# Patient Record
Sex: FEMALE | Race: OTHER | HISPANIC OR LATINO | URBAN - METROPOLITAN AREA
[De-identification: names, ages, dates, MRNs, and addresses within clinical notes are randomized per-mention and may not be internally consistent; named-entity substitution may affect disease eponyms.]

---

## 2017-12-09 ENCOUNTER — EMERGENCY (EMERGENCY)
Facility: HOSPITAL | Age: 32
LOS: 1 days | Discharge: HOME | End: 2017-12-09
Attending: EMERGENCY MEDICINE
Payer: MEDICAID

## 2017-12-09 VITALS
WEIGHT: 229.94 LBS | RESPIRATION RATE: 18 BRPM | HEART RATE: 63 BPM | DIASTOLIC BLOOD PRESSURE: 72 MMHG | HEIGHT: 60 IN | OXYGEN SATURATION: 99 % | SYSTOLIC BLOOD PRESSURE: 114 MMHG | TEMPERATURE: 98 F

## 2017-12-09 DIAGNOSIS — Z98.89 OTHER SPECIFIED POSTPROCEDURAL STATES: Chronic | ICD-10-CM

## 2017-12-09 DIAGNOSIS — Z90.49 ACQUIRED ABSENCE OF OTHER SPECIFIED PARTS OF DIGESTIVE TRACT: Chronic | ICD-10-CM

## 2017-12-09 LAB
ALBUMIN SERPL ELPH-MCNC: 4.4 G/DL — SIGNIFICANT CHANGE UP (ref 3.3–5.2)
ALP SERPL-CCNC: 66 U/L — SIGNIFICANT CHANGE UP (ref 40–120)
ALT FLD-CCNC: 103 U/L — HIGH
ANION GAP SERPL CALC-SCNC: 15 MMOL/L — SIGNIFICANT CHANGE UP (ref 5–17)
AST SERPL-CCNC: 56 U/L — HIGH
BASOPHILS # BLD AUTO: 0 K/UL — SIGNIFICANT CHANGE UP (ref 0–0.2)
BASOPHILS NFR BLD AUTO: 0.4 % — SIGNIFICANT CHANGE UP (ref 0–2)
BILIRUB SERPL-MCNC: 0.5 MG/DL — SIGNIFICANT CHANGE UP (ref 0.4–2)
BUN SERPL-MCNC: 11 MG/DL — SIGNIFICANT CHANGE UP (ref 8–20)
CALCIUM SERPL-MCNC: 9.6 MG/DL — SIGNIFICANT CHANGE UP (ref 8.6–10.2)
CHLORIDE SERPL-SCNC: 101 MMOL/L — SIGNIFICANT CHANGE UP (ref 98–107)
CO2 SERPL-SCNC: 22 MMOL/L — SIGNIFICANT CHANGE UP (ref 22–29)
CREAT SERPL-MCNC: 0.49 MG/DL — LOW (ref 0.5–1.3)
EOSINOPHIL # BLD AUTO: 0.1 K/UL — SIGNIFICANT CHANGE UP (ref 0–0.5)
EOSINOPHIL NFR BLD AUTO: 1.5 % — SIGNIFICANT CHANGE UP (ref 0–6)
GLUCOSE SERPL-MCNC: 110 MG/DL — SIGNIFICANT CHANGE UP (ref 70–115)
HCG UR QL: NEGATIVE — SIGNIFICANT CHANGE UP
HCT VFR BLD CALC: 41.8 % — SIGNIFICANT CHANGE UP (ref 37–47)
HGB BLD-MCNC: 14.6 G/DL — SIGNIFICANT CHANGE UP (ref 12–16)
LYMPHOCYTES # BLD AUTO: 2.8 K/UL — SIGNIFICANT CHANGE UP (ref 1–4.8)
LYMPHOCYTES # BLD AUTO: 32.9 % — SIGNIFICANT CHANGE UP (ref 20–55)
MCHC RBC-ENTMCNC: 29.6 PG — SIGNIFICANT CHANGE UP (ref 27–31)
MCHC RBC-ENTMCNC: 34.9 G/DL — SIGNIFICANT CHANGE UP (ref 32–36)
MCV RBC AUTO: 84.6 FL — SIGNIFICANT CHANGE UP (ref 81–99)
MONOCYTES # BLD AUTO: 0.4 K/UL — SIGNIFICANT CHANGE UP (ref 0–0.8)
MONOCYTES NFR BLD AUTO: 5.3 % — SIGNIFICANT CHANGE UP (ref 3–10)
NEUTROPHILS # BLD AUTO: 5 K/UL — SIGNIFICANT CHANGE UP (ref 1.8–8)
NEUTROPHILS NFR BLD AUTO: 59.7 % — SIGNIFICANT CHANGE UP (ref 37–73)
PLATELET # BLD AUTO: 280 K/UL — SIGNIFICANT CHANGE UP (ref 150–400)
POTASSIUM SERPL-MCNC: 4 MMOL/L — SIGNIFICANT CHANGE UP (ref 3.5–5.3)
POTASSIUM SERPL-SCNC: 4 MMOL/L — SIGNIFICANT CHANGE UP (ref 3.5–5.3)
PROT SERPL-MCNC: 7.8 G/DL — SIGNIFICANT CHANGE UP (ref 6.6–8.7)
RBC # BLD: 4.94 M/UL — SIGNIFICANT CHANGE UP (ref 4.4–5.2)
RBC # FLD: 13.3 % — SIGNIFICANT CHANGE UP (ref 11–15.6)
SODIUM SERPL-SCNC: 138 MMOL/L — SIGNIFICANT CHANGE UP (ref 135–145)
WBC # BLD: 8.4 K/UL — SIGNIFICANT CHANGE UP (ref 4.8–10.8)
WBC # FLD AUTO: 8.4 K/UL — SIGNIFICANT CHANGE UP (ref 4.8–10.8)

## 2017-12-09 PROCEDURE — 99284 EMERGENCY DEPT VISIT MOD MDM: CPT

## 2017-12-09 PROCEDURE — 36415 COLL VENOUS BLD VENIPUNCTURE: CPT

## 2017-12-09 PROCEDURE — 96374 THER/PROPH/DIAG INJ IV PUSH: CPT

## 2017-12-09 PROCEDURE — 99284 EMERGENCY DEPT VISIT MOD MDM: CPT | Mod: 25

## 2017-12-09 PROCEDURE — 81025 URINE PREGNANCY TEST: CPT

## 2017-12-09 PROCEDURE — 96375 TX/PRO/DX INJ NEW DRUG ADDON: CPT

## 2017-12-09 PROCEDURE — 85027 COMPLETE CBC AUTOMATED: CPT

## 2017-12-09 PROCEDURE — 76705 ECHO EXAM OF ABDOMEN: CPT

## 2017-12-09 PROCEDURE — 83690 ASSAY OF LIPASE: CPT

## 2017-12-09 PROCEDURE — T1013: CPT

## 2017-12-09 PROCEDURE — 76705 ECHO EXAM OF ABDOMEN: CPT | Mod: 26

## 2017-12-09 PROCEDURE — 80053 COMPREHEN METABOLIC PANEL: CPT

## 2017-12-09 PROCEDURE — 84702 CHORIONIC GONADOTROPIN TEST: CPT

## 2017-12-09 RX ORDER — PANTOPRAZOLE SODIUM 20 MG/1
40 TABLET, DELAYED RELEASE ORAL ONCE
Qty: 0 | Refills: 0 | Status: COMPLETED | OUTPATIENT
Start: 2017-12-09 | End: 2017-12-09

## 2017-12-09 RX ORDER — FAMOTIDINE 10 MG/ML
1 INJECTION INTRAVENOUS
Qty: 14 | Refills: 0 | OUTPATIENT
Start: 2017-12-09 | End: 2017-12-16

## 2017-12-09 RX ORDER — FAMOTIDINE 10 MG/ML
20 INJECTION INTRAVENOUS ONCE
Qty: 0 | Refills: 0 | Status: COMPLETED | OUTPATIENT
Start: 2017-12-09 | End: 2017-12-09

## 2017-12-09 RX ORDER — OMEPRAZOLE 10 MG/1
1 CAPSULE, DELAYED RELEASE ORAL
Qty: 30 | Refills: 0 | OUTPATIENT
Start: 2017-12-09 | End: 2018-01-08

## 2017-12-09 RX ORDER — METOCLOPRAMIDE HCL 10 MG
10 TABLET ORAL ONCE
Qty: 0 | Refills: 0 | Status: COMPLETED | OUTPATIENT
Start: 2017-12-09 | End: 2017-12-09

## 2017-12-09 RX ADMIN — Medication 30 MILLILITER(S): at 19:36

## 2017-12-09 RX ADMIN — FAMOTIDINE 20 MILLIGRAM(S): 10 INJECTION INTRAVENOUS at 19:36

## 2017-12-09 RX ADMIN — Medication 10 MILLIGRAM(S): at 15:08

## 2017-12-09 RX ADMIN — PANTOPRAZOLE SODIUM 40 MILLIGRAM(S): 20 TABLET, DELAYED RELEASE ORAL at 18:21

## 2017-12-09 NOTE — ED ADULT NURSE NOTE - OBJECTIVE STATEMENT
pt presents to ED with epigastric pain and nausea x one month. pt dneies vomiting/diahrea. afebrile. denies urinary complaints. a&ox3

## 2017-12-09 NOTE — ED STATDOCS - MEDICAL DECISION MAKING DETAILS
31 y/o F presenting with signs of gastritis vs biliary colic as she is s/p cholecystectomy and exam points to epigastric TTP. Plan to treat for GERD. Will order US to r/o retained stones and eval liver as she has hx of fatty liver and check basic labs. Pt will need to f/u with gastroenterology.

## 2017-12-09 NOTE — ED STATDOCS - ENMT, MLM
Uvula is midline. Mild tonsillar enlargement. No exudates or erythema. Airway patent. No lymphadenopathy.

## 2017-12-09 NOTE — ED STATDOCS - CARE PLAN
Principal Discharge DX:	Gastritis, presence of bleeding unspecified, unspecified chronicity, unspecified gastritis type  Secondary Diagnosis:	Gastroesophageal reflux disease, esophagitis presence not specified

## 2017-12-09 NOTE — ED ADULT TRIAGE NOTE - CHIEF COMPLAINT QUOTE
Patient arrived ambulatory to ED, awake, alert, and oriented times 3, breathing unlabored.  patient complaining of right upper abdominal pain which has been present for 1 week.  patient also states N/V.  Patient states pain increases upon eating.

## 2017-12-09 NOTE — ED STATDOCS - PROGRESS NOTE DETAILS
I reassessed the patient and shared results. Patient states she does not feel any better and still feels as though her stomach is burning and has a sour taste in her mouth. I offered to try other medication and patient agrees. PA NOTE: Pt seen by intake physician and hpi/orders/plan reviewed. PT presenting to ED with complaints of epigastric burning x 1 week.  Denies vomiting.  PE: GEN: Awake, alert,  NAD,  EYES: PERRL CARDIAC: Reg rate and rhythm, S1,S2, RRR  RESP: No distress noted. Lungs CTA bilaterally no wheeze, ronchi, rales. ABD: soft,  non-tender, no guarding. . NEURO: AOx3, no focal deficits   PLAN: will treat for GERD and f/u with GI pt feels better

## 2017-12-09 NOTE — ED STATDOCS - OBJECTIVE STATEMENT
31 y/o F presents to ED c/o RUQ abd pain x1 week. Associated sx include nausea. Pt states she has decreased PO intake and difficulty drinking liquids including water which prompted her visit to the ED today. She reports she has been taking Tejocote root weight loss pills 1 month ago. She has not taken any medications for her current sx. Pt reports seeing hepatologist years ago for fatty liver but does not follow up regularly. Denies fever, diarrhea, hx of similar sx, sore throat, cough, CP, SOB, constipation, urinary sx, rash, swelling to the lower extremities or any other complaints at this time. PMD: Dr. Hernández. : Che

## 2018-02-13 ENCOUNTER — EMERGENCY (EMERGENCY)
Facility: HOSPITAL | Age: 33
LOS: 1 days | Discharge: HOME | End: 2018-02-13
Attending: EMERGENCY MEDICINE | Admitting: EMERGENCY MEDICINE
Payer: MEDICAID

## 2018-02-13 VITALS
TEMPERATURE: 98 F | RESPIRATION RATE: 18 BRPM | DIASTOLIC BLOOD PRESSURE: 72 MMHG | OXYGEN SATURATION: 97 % | SYSTOLIC BLOOD PRESSURE: 107 MMHG | HEART RATE: 91 BPM

## 2018-02-13 VITALS — WEIGHT: 227.96 LBS | HEIGHT: 61 IN

## 2018-02-13 DIAGNOSIS — Z98.89 OTHER SPECIFIED POSTPROCEDURAL STATES: Chronic | ICD-10-CM

## 2018-02-13 DIAGNOSIS — Z90.49 ACQUIRED ABSENCE OF OTHER SPECIFIED PARTS OF DIGESTIVE TRACT: Chronic | ICD-10-CM

## 2018-02-13 PROCEDURE — 99284 EMERGENCY DEPT VISIT MOD MDM: CPT

## 2018-02-13 PROCEDURE — T1013: CPT

## 2018-02-13 PROCEDURE — 99283 EMERGENCY DEPT VISIT LOW MDM: CPT

## 2018-02-13 RX ORDER — IBUPROFEN 200 MG
1 TABLET ORAL
Qty: 20 | Refills: 0 | OUTPATIENT
Start: 2018-02-13 | End: 2018-02-17

## 2018-02-13 RX ORDER — IBUPROFEN 200 MG
400 TABLET ORAL ONCE
Qty: 0 | Refills: 0 | Status: COMPLETED | OUTPATIENT
Start: 2018-02-13 | End: 2018-02-13

## 2018-02-13 RX ORDER — IBUPROFEN 200 MG
1 TABLET ORAL
Qty: 28 | Refills: 0 | OUTPATIENT
Start: 2018-02-13 | End: 2018-02-19

## 2018-02-13 RX ORDER — GUAIFENESIN/DEXTROMETHORPHAN 600MG-30MG
10 TABLET, EXTENDED RELEASE 12 HR ORAL
Qty: 200 | Refills: 0 | OUTPATIENT
Start: 2018-02-13 | End: 2018-02-17

## 2018-02-13 RX ADMIN — Medication 75 MILLIGRAM(S): at 15:46

## 2018-02-13 RX ADMIN — Medication 400 MILLIGRAM(S): at 15:46

## 2018-02-13 NOTE — ED STATDOCS - OBJECTIVE STATEMENT
33 year old female presenting to the ED complaining of a fever, cough, and body aches x 2 days. She denies having any HA. 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 She notes her LMP was 1/17/18. Pt states that she does not smoke nor drink. No further complaints at this time.  : Dia

## 2018-02-13 NOTE — ED ADULT NURSE NOTE - OBJECTIVE STATEMENT
pt presents to ED with sore throat, body aches, fever and cough x two days. a&ox3,. breathing si even and unlabored. will continue to monitor and reassess

## 2018-03-08 ENCOUNTER — EMERGENCY (EMERGENCY)
Facility: HOSPITAL | Age: 33
LOS: 1 days | Discharge: HOME | End: 2018-03-08
Attending: EMERGENCY MEDICINE | Admitting: EMERGENCY MEDICINE
Payer: MEDICAID

## 2018-03-08 VITALS
HEART RATE: 86 BPM | DIASTOLIC BLOOD PRESSURE: 80 MMHG | TEMPERATURE: 99 F | OXYGEN SATURATION: 98 % | SYSTOLIC BLOOD PRESSURE: 124 MMHG | RESPIRATION RATE: 18 BRPM

## 2018-03-08 VITALS — HEIGHT: 61 IN | WEIGHT: 227.96 LBS

## 2018-03-08 DIAGNOSIS — Z90.49 ACQUIRED ABSENCE OF OTHER SPECIFIED PARTS OF DIGESTIVE TRACT: Chronic | ICD-10-CM

## 2018-03-08 DIAGNOSIS — Z98.89 OTHER SPECIFIED POSTPROCEDURAL STATES: Chronic | ICD-10-CM

## 2018-03-08 LAB
ALBUMIN SERPL ELPH-MCNC: 4.1 G/DL — SIGNIFICANT CHANGE UP (ref 3.3–5.2)
ALP SERPL-CCNC: 84 U/L — SIGNIFICANT CHANGE UP (ref 40–120)
ALT FLD-CCNC: 25 U/L — SIGNIFICANT CHANGE UP
ANION GAP SERPL CALC-SCNC: 12 MMOL/L — SIGNIFICANT CHANGE UP (ref 5–17)
APPEARANCE UR: CLEAR — SIGNIFICANT CHANGE UP
AST SERPL-CCNC: 15 U/L — SIGNIFICANT CHANGE UP
BACTERIA # UR AUTO: ABNORMAL
BASOPHILS # BLD AUTO: 0 K/UL — SIGNIFICANT CHANGE UP (ref 0–0.2)
BASOPHILS NFR BLD AUTO: 0.3 % — SIGNIFICANT CHANGE UP (ref 0–2)
BILIRUB SERPL-MCNC: 0.2 MG/DL — LOW (ref 0.4–2)
BILIRUB UR-MCNC: NEGATIVE — SIGNIFICANT CHANGE UP
BUN SERPL-MCNC: 12 MG/DL — SIGNIFICANT CHANGE UP (ref 8–20)
CALCIUM SERPL-MCNC: 9.8 MG/DL — SIGNIFICANT CHANGE UP (ref 8.6–10.2)
CHLORIDE SERPL-SCNC: 103 MMOL/L — SIGNIFICANT CHANGE UP (ref 98–107)
CO2 SERPL-SCNC: 21 MMOL/L — LOW (ref 22–29)
COLOR SPEC: YELLOW — SIGNIFICANT CHANGE UP
CREAT SERPL-MCNC: 0.47 MG/DL — LOW (ref 0.5–1.3)
DIFF PNL FLD: ABNORMAL
EOSINOPHIL # BLD AUTO: 0.2 K/UL — SIGNIFICANT CHANGE UP (ref 0–0.5)
EOSINOPHIL NFR BLD AUTO: 1.5 % — SIGNIFICANT CHANGE UP (ref 0–6)
EPI CELLS # UR: SIGNIFICANT CHANGE UP
GLUCOSE SERPL-MCNC: 97 MG/DL — SIGNIFICANT CHANGE UP (ref 70–115)
GLUCOSE UR QL: NEGATIVE MG/DL — SIGNIFICANT CHANGE UP
HCG SERPL-ACNC: 5708 MIU/ML — SIGNIFICANT CHANGE UP
HCT VFR BLD CALC: 38.2 % — SIGNIFICANT CHANGE UP (ref 37–47)
HGB BLD-MCNC: 13.1 G/DL — SIGNIFICANT CHANGE UP (ref 12–16)
KETONES UR-MCNC: NEGATIVE — SIGNIFICANT CHANGE UP
LEUKOCYTE ESTERASE UR-ACNC: NEGATIVE — SIGNIFICANT CHANGE UP
LYMPHOCYTES # BLD AUTO: 28 % — SIGNIFICANT CHANGE UP (ref 20–55)
LYMPHOCYTES # BLD AUTO: 3.2 K/UL — SIGNIFICANT CHANGE UP (ref 1–4.8)
MCHC RBC-ENTMCNC: 28.9 PG — SIGNIFICANT CHANGE UP (ref 27–31)
MCHC RBC-ENTMCNC: 34.3 G/DL — SIGNIFICANT CHANGE UP (ref 32–36)
MCV RBC AUTO: 84.3 FL — SIGNIFICANT CHANGE UP (ref 81–99)
MONOCYTES # BLD AUTO: 0.9 K/UL — HIGH (ref 0–0.8)
MONOCYTES NFR BLD AUTO: 7.8 % — SIGNIFICANT CHANGE UP (ref 3–10)
NEUTROPHILS # BLD AUTO: 7.2 K/UL — SIGNIFICANT CHANGE UP (ref 1.8–8)
NEUTROPHILS NFR BLD AUTO: 62.1 % — SIGNIFICANT CHANGE UP (ref 37–73)
NITRITE UR-MCNC: NEGATIVE — SIGNIFICANT CHANGE UP
PH UR: 6 — SIGNIFICANT CHANGE UP (ref 5–8)
PLATELET # BLD AUTO: 295 K/UL — SIGNIFICANT CHANGE UP (ref 150–400)
POTASSIUM SERPL-MCNC: 3.9 MMOL/L — SIGNIFICANT CHANGE UP (ref 3.5–5.3)
POTASSIUM SERPL-SCNC: 3.9 MMOL/L — SIGNIFICANT CHANGE UP (ref 3.5–5.3)
PROT SERPL-MCNC: 7.4 G/DL — SIGNIFICANT CHANGE UP (ref 6.6–8.7)
PROT UR-MCNC: NEGATIVE MG/DL — SIGNIFICANT CHANGE UP
RBC # BLD: 4.53 M/UL — SIGNIFICANT CHANGE UP (ref 4.4–5.2)
RBC # FLD: 13.4 % — SIGNIFICANT CHANGE UP (ref 11–15.6)
RBC CASTS # UR COMP ASSIST: ABNORMAL /HPF (ref 0–4)
SODIUM SERPL-SCNC: 136 MMOL/L — SIGNIFICANT CHANGE UP (ref 135–145)
SP GR SPEC: 1.02 — SIGNIFICANT CHANGE UP (ref 1.01–1.02)
UROBILINOGEN FLD QL: NEGATIVE MG/DL — SIGNIFICANT CHANGE UP
WBC # BLD: 11.6 K/UL — HIGH (ref 4.8–10.8)
WBC # FLD AUTO: 11.6 K/UL — HIGH (ref 4.8–10.8)
WBC UR QL: SIGNIFICANT CHANGE UP

## 2018-03-08 PROCEDURE — 86901 BLOOD TYPING SEROLOGIC RH(D): CPT

## 2018-03-08 PROCEDURE — T1013: CPT

## 2018-03-08 PROCEDURE — 86850 RBC ANTIBODY SCREEN: CPT

## 2018-03-08 PROCEDURE — 76801 OB US < 14 WKS SINGLE FETUS: CPT | Mod: 26

## 2018-03-08 PROCEDURE — 76817 TRANSVAGINAL US OBSTETRIC: CPT | Mod: 26

## 2018-03-08 PROCEDURE — 99284 EMERGENCY DEPT VISIT MOD MDM: CPT | Mod: 25

## 2018-03-08 PROCEDURE — 36415 COLL VENOUS BLD VENIPUNCTURE: CPT

## 2018-03-08 PROCEDURE — 85027 COMPLETE CBC AUTOMATED: CPT

## 2018-03-08 PROCEDURE — 76817 TRANSVAGINAL US OBSTETRIC: CPT

## 2018-03-08 PROCEDURE — 81001 URINALYSIS AUTO W/SCOPE: CPT

## 2018-03-08 PROCEDURE — 80053 COMPREHEN METABOLIC PANEL: CPT

## 2018-03-08 PROCEDURE — 86900 BLOOD TYPING SEROLOGIC ABO: CPT

## 2018-03-08 PROCEDURE — 76801 OB US < 14 WKS SINGLE FETUS: CPT

## 2018-03-08 PROCEDURE — 99284 EMERGENCY DEPT VISIT MOD MDM: CPT

## 2018-03-08 PROCEDURE — 84702 CHORIONIC GONADOTROPIN TEST: CPT

## 2018-03-08 NOTE — ED STATDOCS - OBJECTIVE STATEMENT
32 y/o F pt with hx of cholelithiasis, hernia, and presents to ED c/o vaginal bleeding. Pt states she is pregnant. She took 2 at home pregnancy tests this week that were positive. She had a small amount of vaginal bleeding today that has since resolved. Denies fever and abdominal pain. No further complaints at this time.   LMP: 12/15  A1

## 2018-03-09 LAB
BLD GP AB SCN SERPL QL: SIGNIFICANT CHANGE UP
TYPE + AB SCN PNL BLD: SIGNIFICANT CHANGE UP

## 2020-08-24 ENCOUNTER — APPOINTMENT (OUTPATIENT)
Dept: DERMATOLOGY | Facility: CLINIC | Age: 35
End: 2020-08-24
Payer: MEDICARE

## 2020-08-24 DIAGNOSIS — Z84.0 FAMILY HISTORY OF DISEASES OF THE SKIN AND SUBCUTANEOUS TISSUE: ICD-10-CM

## 2020-08-24 DIAGNOSIS — D48.5 NEOPLASM OF UNCERTAIN BEHAVIOR OF SKIN: ICD-10-CM

## 2020-08-24 PROCEDURE — 99202 OFFICE O/P NEW SF 15 MIN: CPT

## 2020-08-24 NOTE — HISTORY OF PRESENT ILLNESS
[FreeTextEntry1] : Tender lump in neck [de-identified] : First visit for 35-year-old Austrian-speaking female referred by Amanda Morel D.O., with a several year history of a "ball" behind the left ear. Has recently become tender.  No history of cancer.

## 2020-08-24 NOTE — PHYSICAL EXAM
[Alert] : alert [Oriented x 3] : ~L oriented x 3 [Well Nourished] : well nourished [FreeTextEntry3] : Type II skin\par \par Patient wearing a facemask\par \par Left upper neck: 3 x 3 mm firm freely movable skin colored nodule

## 2020-08-24 NOTE — CONSULT LETTER
[Dear  ___] : Dear  [unfilled], [Sincerely,] : Sincerely, [Consult Letter:] : I had the pleasure of evaluating your patient, [unfilled]. [Consult Closing:] : Thank you very much for allowing me to participate in the care of this patient.  If you have any questions, please do not hesitate to contact me. [FreeTextEntry2] : Amanda Morel D.O. [FreeTextEntry1] : She has a 3 mm firm freely movable skin-colored nodule on the left upper neck suggestive of a calcified cyst.  It is not suspicious for a malignancy.\par \par I have referred her to our [place] office for excision if desired as this is something I do not do.\par \par Please see attached chart note for further details\par  [FreeTextEntry3] : Cecil Jamison MD\par [address], [address]\par [place] [address]\par Tel ([phone])\par Fax ([phone])\par Private line ([phone])\par

## 2021-04-06 ENCOUNTER — EMERGENCY (EMERGENCY)
Facility: HOSPITAL | Age: 36
LOS: 1 days | Discharge: HOME | End: 2021-04-06
Attending: EMERGENCY MEDICINE
Payer: COMMERCIAL

## 2021-04-06 VITALS
OXYGEN SATURATION: 99 % | HEART RATE: 66 BPM | TEMPERATURE: 98 F | DIASTOLIC BLOOD PRESSURE: 78 MMHG | SYSTOLIC BLOOD PRESSURE: 120 MMHG

## 2021-04-06 VITALS
DIASTOLIC BLOOD PRESSURE: 85 MMHG | HEART RATE: 76 BPM | SYSTOLIC BLOOD PRESSURE: 128 MMHG | TEMPERATURE: 99 F | OXYGEN SATURATION: 98 % | HEIGHT: 61 IN | RESPIRATION RATE: 20 BRPM | WEIGHT: 225.09 LBS

## 2021-04-06 DIAGNOSIS — Z90.49 ACQUIRED ABSENCE OF OTHER SPECIFIED PARTS OF DIGESTIVE TRACT: Chronic | ICD-10-CM

## 2021-04-06 DIAGNOSIS — Z98.89 OTHER SPECIFIED POSTPROCEDURAL STATES: Chronic | ICD-10-CM

## 2021-04-06 LAB
ALBUMIN SERPL ELPH-MCNC: 4.2 G/DL — SIGNIFICANT CHANGE UP (ref 3.3–5.2)
ALP SERPL-CCNC: 74 U/L — SIGNIFICANT CHANGE UP (ref 40–120)
ALT FLD-CCNC: 25 U/L — SIGNIFICANT CHANGE UP
ANION GAP SERPL CALC-SCNC: 14 MMOL/L — SIGNIFICANT CHANGE UP (ref 5–17)
AST SERPL-CCNC: 22 U/L — SIGNIFICANT CHANGE UP
BASOPHILS # BLD AUTO: 0.05 K/UL — SIGNIFICANT CHANGE UP (ref 0–0.2)
BASOPHILS NFR BLD AUTO: 0.6 % — SIGNIFICANT CHANGE UP (ref 0–2)
BILIRUB SERPL-MCNC: <0.2 MG/DL — LOW (ref 0.4–2)
BUN SERPL-MCNC: 12 MG/DL — SIGNIFICANT CHANGE UP (ref 8–20)
CALCIUM SERPL-MCNC: 9.1 MG/DL — SIGNIFICANT CHANGE UP (ref 8.6–10.2)
CHLORIDE SERPL-SCNC: 107 MMOL/L — SIGNIFICANT CHANGE UP (ref 98–107)
CO2 SERPL-SCNC: 21 MMOL/L — LOW (ref 22–29)
CREAT SERPL-MCNC: 0.51 MG/DL — SIGNIFICANT CHANGE UP (ref 0.5–1.3)
D DIMER BLD IA.RAPID-MCNC: 402 NG/ML DDU — HIGH
EOSINOPHIL # BLD AUTO: 0.17 K/UL — SIGNIFICANT CHANGE UP (ref 0–0.5)
EOSINOPHIL NFR BLD AUTO: 2 % — SIGNIFICANT CHANGE UP (ref 0–6)
GLUCOSE SERPL-MCNC: 105 MG/DL — HIGH (ref 70–99)
HCG SERPL-ACNC: <4 MIU/ML — SIGNIFICANT CHANGE UP
HCT VFR BLD CALC: 39.4 % — SIGNIFICANT CHANGE UP (ref 34.5–45)
HGB BLD-MCNC: 13.5 G/DL — SIGNIFICANT CHANGE UP (ref 11.5–15.5)
IMM GRANULOCYTES NFR BLD AUTO: 0.3 % — SIGNIFICANT CHANGE UP (ref 0–1.5)
LYMPHOCYTES # BLD AUTO: 3.01 K/UL — SIGNIFICANT CHANGE UP (ref 1–3.3)
LYMPHOCYTES # BLD AUTO: 34.8 % — SIGNIFICANT CHANGE UP (ref 13–44)
MCHC RBC-ENTMCNC: 29.3 PG — SIGNIFICANT CHANGE UP (ref 27–34)
MCHC RBC-ENTMCNC: 34.3 GM/DL — SIGNIFICANT CHANGE UP (ref 32–36)
MCV RBC AUTO: 85.7 FL — SIGNIFICANT CHANGE UP (ref 80–100)
MONOCYTES # BLD AUTO: 0.55 K/UL — SIGNIFICANT CHANGE UP (ref 0–0.9)
MONOCYTES NFR BLD AUTO: 6.4 % — SIGNIFICANT CHANGE UP (ref 2–14)
NEUTROPHILS # BLD AUTO: 4.83 K/UL — SIGNIFICANT CHANGE UP (ref 1.8–7.4)
NEUTROPHILS NFR BLD AUTO: 55.9 % — SIGNIFICANT CHANGE UP (ref 43–77)
PLATELET # BLD AUTO: 330 K/UL — SIGNIFICANT CHANGE UP (ref 150–400)
POTASSIUM SERPL-MCNC: 4.3 MMOL/L — SIGNIFICANT CHANGE UP (ref 3.5–5.3)
POTASSIUM SERPL-SCNC: 4.3 MMOL/L — SIGNIFICANT CHANGE UP (ref 3.5–5.3)
PROT SERPL-MCNC: 7.5 G/DL — SIGNIFICANT CHANGE UP (ref 6.6–8.7)
RBC # BLD: 4.6 M/UL — SIGNIFICANT CHANGE UP (ref 3.8–5.2)
RBC # FLD: 12.4 % — SIGNIFICANT CHANGE UP (ref 10.3–14.5)
SODIUM SERPL-SCNC: 142 MMOL/L — SIGNIFICANT CHANGE UP (ref 135–145)
TROPONIN T SERPL-MCNC: <0.01 NG/ML — SIGNIFICANT CHANGE UP (ref 0–0.06)
WBC # BLD: 8.64 K/UL — SIGNIFICANT CHANGE UP (ref 3.8–10.5)
WBC # FLD AUTO: 8.64 K/UL — SIGNIFICANT CHANGE UP (ref 3.8–10.5)

## 2021-04-06 PROCEDURE — 36415 COLL VENOUS BLD VENIPUNCTURE: CPT

## 2021-04-06 PROCEDURE — 85379 FIBRIN DEGRADATION QUANT: CPT

## 2021-04-06 PROCEDURE — 85025 COMPLETE CBC W/AUTO DIFF WBC: CPT

## 2021-04-06 PROCEDURE — 99285 EMERGENCY DEPT VISIT HI MDM: CPT

## 2021-04-06 PROCEDURE — 99284 EMERGENCY DEPT VISIT MOD MDM: CPT | Mod: 25

## 2021-04-06 PROCEDURE — 93010 ELECTROCARDIOGRAM REPORT: CPT

## 2021-04-06 PROCEDURE — 71046 X-RAY EXAM CHEST 2 VIEWS: CPT | Mod: 26

## 2021-04-06 PROCEDURE — 71046 X-RAY EXAM CHEST 2 VIEWS: CPT

## 2021-04-06 PROCEDURE — 71275 CT ANGIOGRAPHY CHEST: CPT | Mod: 26,MA

## 2021-04-06 PROCEDURE — 80053 COMPREHEN METABOLIC PANEL: CPT

## 2021-04-06 PROCEDURE — 84702 CHORIONIC GONADOTROPIN TEST: CPT

## 2021-04-06 PROCEDURE — 84484 ASSAY OF TROPONIN QUANT: CPT

## 2021-04-06 PROCEDURE — 93005 ELECTROCARDIOGRAM TRACING: CPT

## 2021-04-06 PROCEDURE — 71275 CT ANGIOGRAPHY CHEST: CPT

## 2021-04-06 RX ORDER — ACETAMINOPHEN 500 MG
650 TABLET ORAL ONCE
Refills: 0 | Status: COMPLETED | OUTPATIENT
Start: 2021-04-06 | End: 2021-04-06

## 2021-04-06 RX ADMIN — Medication 650 MILLIGRAM(S): at 18:44

## 2021-04-06 NOTE — ED ADULT NURSE NOTE - OBJECTIVE STATEMENT
Pt A&OX3, amb ad bart, c/o worsening mid-upper back that radiates to mid chest.  Pt states pain is exacerbated when she carries her son or any heavy object.  Pt denies any cough/sob.  Chapinaer bsb, abd soft nondistended, nontender, moving all ext well.

## 2021-04-06 NOTE — ED STATDOCS - OBJECTIVE STATEMENT
37 y/o F pt with significant PMHx of Cholelithiasis, Hernia, Obese, COVID (Dec 2020) and Renal stones presents to the ED c/o intermittent chest discomfort and pleuritic upper right back pain since COVID in Dec. Pt Denies Hx of PE/DVT, cardiac hx, fever, chills and other medical complaints.

## 2021-04-06 NOTE — ED ADULT TRIAGE NOTE - CHIEF COMPLAINT QUOTE
Pt arrives to ED c/o chest pain since December when she was diagnosed with COVID . STAT EKG obtained

## 2021-04-06 NOTE — ED STATDOCS - NSFOLLOWUPCLINICS_GEN_ALL_ED_FT
[facility]  Cardiology  [address], [address]  [place] [address]  Phone: [phone]  Fax:

## 2021-04-06 NOTE — ED STATDOCS - NSFOLLOWUPINSTRUCTIONS_ED_ALL_ED_FT
Patient education: Chest pain (The Basics)  View in [place]  Written by the doctors and editors at [facility]  Should I call for an ambulance if I have chest pain?  You should call for an ambulance (in the [place] and [place], dial 9-1-1) if the pain:    ?Is new or severe    ?Happens along with shortness of breath    ?Lasts more than a few minutes    ?Gets worse when you walk, climb stairs, or do other types of physical activity    ?Scares or worries you    Having chest pain does not necessarily mean you are having a heart attack. Most people who go to the emergency room with chest pain are not having a heart attack. Their pain is usually caused by less serious problems, such as muscle pain, heartburn, or anxiety. Even so, you should not take any chances.    People often delay seeking help for a heart attack because they think the symptoms are not serious or will go away. When they do that, they risk permanent damage to their heart – or even death.    Is chest pain the only important symptom of a heart attack?  No. Other symptoms are important, too. Sometimes people do not go to the hospital because they do not have any pain at all. But it is possible to have a heart attack without pain. This is more likely in women, people with diabetes, and people older than 60.    It is important to pay attention to any of the symptoms of a heart attack (figure 1), which can include:    ?Pain, pressure, or discomfort in the center of the chest    ?Pain, tingling, or discomfort in other parts of the upper body, including the arms, back, neck, jaw, or stomach    ?Shortness of breath    ?Nausea, vomiting, burping, or heartburn    ?Sweating or having cold, clammy skin    ?A racing or uneven heart rate    ?Feeling dizzy or lightheaded or even fainting    These symptoms are important if they last more than a few minutes or keep happening over and over (coming and going). If you think you might be having a heart attack, call for an ambulance (in the [place] and [place], dial 9-1-1) right away. Do not try to get to the hospital on your own.    Is heart attack the only cause of chest pain?  No. Chest pain can be caused by lots of other problems, including:    ?Heart problems other than heart attacks, such as infection around the heart    ?Muscle soreness after an activity that involves the chest muscles    ?Diseases that cause pain, such as arthritis    ?Shingles (herpes zoster), a condition linked to the chickenpox virus that also causes a painful rash    ?Any kind of injury to the chest, including surgery    ?Digestive problems such as heartburn, acid reflux, stomach ulcers, or irritable bowel syndrome    ?Problems affecting the lungs, such as pneumonia (an infection in the lungs) or blood clots in the lungs    ?Psychological problems, such as panic disorder or depression    ?Weakening of the lining of the big blood vessel in the chest (called the aorta)    What will happen if I go to the emergency room?  The people treating you in the emergency room will examine you and then run tests to try to find the cause of your pain. But don't be surprised if you do not find out right away why you have pain. The cause of chest pain is not always easy to find. Even so, doctors can usually tell if your heart is in trouble.    The tests you might have include:    ?An electrocardiogram (ECG) – This test measures the electrical activity in your heart (figure 2). It can help doctors find out if you are having a heart attack.    ?Blood tests – During a heart attack, the heart releases certain chemicals. If these chemicals are in your blood, it might mean you are having a heart attack.    ?A stress test – During a stress test, you might be asked to run or walk on a treadmill while you also have an ECG (figure 3). Physical activity increases the heart's need for blood. This test helps doctors see if the heart is getting enough blood. If you cannot walk or run, your doctor might do this test by giving you a medicine to make your heart pump faster.    ?Cardiac catheterization (also called "cardiac cath") – During this test, the doctor puts a thin tube into a blood vessel in your leg or arm. Then they moves the tube up to your heart. Next, the doctor puts a dye that shows up on X-ray into the tube. This part of the test is called "coronary angiography." It can show whether any of the arteries in your heart are clogged.    ?A CT scan – This is a special kind of X-ray. Your doctor might use this to look at the blood vessels going to your heart.    What if I am having a heart attack?  If you are having a heart attack, the doctor will give you treatments to reduce the damage to your heart and relieve your pain.    The sooner you get treated for a heart attack, the better treatment will work. Every minute counts when it comes to keeping your heart muscle alive!    More on this topic

## 2021-04-06 NOTE — ED STATDOCS - ADDITIONAL NOTES AND INSTRUCTIONS:
PT was evaluated At [facility] ED and was found to have a condition that warranted time of to rest and heal from WORK/SCHOOL.   Felton Light PA-C

## 2021-04-06 NOTE — ED STATDOCS - PATIENT PORTAL LINK FT
You can access the FollowMyHealth Patient Portal offered by [facility] by registering at the following website: [email or web address]. By joining [employer]’s FollowMyHealth portal, you will also be able to view your health information using other applications (apps) compatible with our system.

## 2021-04-06 NOTE — ED STATDOCS - NS ED ROS FT
ROS: No fever/chills. No eye pain/changes in vision, No ear pain/sore throat/dysphagia, (+) chest pain/palpitations. No SOB/cough/. No abdominal pain, N/V/D, no black/bloody bm. No dysuria/frequency/discharge, No headache. No Dizziness.    No rashes or breaks in skin. No numbness/tingling/weakness. (+) back pain

## 2021-04-06 NOTE — ED STATDOCS - CLINICAL SUMMARY MEDICAL DECISION MAKING FREE TEXT BOX
Persistent pleuritic pain for several months since COVID, suspect post COVID inflammation, D-Dimer to r/o PE, CXR, and reassess

## 2021-04-06 NOTE — ED STATDOCS - PHYSICAL EXAMINATION
Gen: No acute distress, non toxic  HEENT: Mucous membranes moist, pink conjunctivae, EOMI  CV: RRR, nl s1/s2.  Resp: CTAB, normal rate and effort  GI: Abdomen soft, NT, ND. No rebound, no guarding  : No CVAT  Neuro: A&O x 3, moving all 4 extremities  MSK: No spine or joint tenderness to palpation. no swelling of b/l lower ext, and no midline ttp,   Skin: No rashes. intact and perfused.

## 2021-04-09 ENCOUNTER — NON-APPOINTMENT (OUTPATIENT)
Age: 36
End: 2021-04-09

## 2021-04-09 ENCOUNTER — APPOINTMENT (OUTPATIENT)
Dept: CARDIOLOGY | Facility: CLINIC | Age: 36
End: 2021-04-09
Payer: COMMERCIAL

## 2021-04-09 VITALS
DIASTOLIC BLOOD PRESSURE: 74 MMHG | HEIGHT: 61 IN | OXYGEN SATURATION: 98 % | SYSTOLIC BLOOD PRESSURE: 106 MMHG | BODY MASS INDEX: 44.18 KG/M2 | WEIGHT: 234 LBS | TEMPERATURE: 98.4 F | HEART RATE: 77 BPM

## 2021-04-09 VITALS — SYSTOLIC BLOOD PRESSURE: 106 MMHG | DIASTOLIC BLOOD PRESSURE: 72 MMHG

## 2021-04-09 DIAGNOSIS — U07.1 COVID-19: ICD-10-CM

## 2021-04-09 DIAGNOSIS — M54.9 DORSALGIA, UNSPECIFIED: ICD-10-CM

## 2021-04-09 DIAGNOSIS — M06.9 RHEUMATOID ARTHRITIS, UNSPECIFIED: ICD-10-CM

## 2021-04-09 DIAGNOSIS — Z86.39 PERSONAL HISTORY OF OTHER ENDOCRINE, NUTRITIONAL AND METABOLIC DISEASE: ICD-10-CM

## 2021-04-09 DIAGNOSIS — Z87.442 PERSONAL HISTORY OF URINARY CALCULI: ICD-10-CM

## 2021-04-09 PROCEDURE — 99204 OFFICE O/P NEW MOD 45 MIN: CPT | Mod: 25

## 2021-04-09 PROCEDURE — 93000 ELECTROCARDIOGRAM COMPLETE: CPT

## 2021-04-09 PROCEDURE — 99072 ADDL SUPL MATRL&STAF TM PHE: CPT

## 2021-04-09 RX ORDER — OMEPRAZOLE 40 MG/1
40 CAPSULE, DELAYED RELEASE ORAL
Qty: 30 | Refills: 3 | Status: ACTIVE | COMMUNITY
Start: 2021-04-09 | End: 1900-01-01

## 2021-04-09 RX ORDER — OXYCODONE HYDROCHLORIDE AND ACETAMINOPHEN 5; 325 MG/1; MG/1
5-325 TABLET ORAL EVERY 6 HOURS
Refills: 0 | Status: DISCONTINUED | COMMUNITY
Start: 2021-04-09 | End: 2021-04-09

## 2021-04-09 RX ORDER — OMEPRAZOLE 40 MG/1
40 CAPSULE, DELAYED RELEASE ORAL
Qty: 90 | Refills: 0 | Status: DISCONTINUED | COMMUNITY
Start: 2021-04-09 | End: 2021-04-09

## 2021-04-09 RX ORDER — MELOXICAM 7.5 MG/1
7.5 TABLET ORAL TWICE DAILY
Qty: 28 | Refills: 0 | Status: ACTIVE | COMMUNITY
Start: 2021-04-09 | End: 1900-01-01

## 2021-04-09 NOTE — REVIEW OF SYSTEMS
[Fever] : no fever [Chills] : no chills [Blurry Vision] : no blurred vision [Earache] : no earache [Shortness Of Breath] : no shortness of breath [Dyspnea on exertion] : not dyspnea during exertion [Chest  Pressure] : chest pressure [Chest Pain] : chest pain [Lower Ext Edema] : no extremity edema [Leg Claudication] : no intermittent leg claudication [Palpitations] : no palpitations [Abdominal Pain] : no abdominal pain [Heartburn] : heartburn [Dysuria] : no dysuria [Joint Pain] : joint pain [Skin: A Rash] : no rash: [Dizziness] : no dizziness [Excessive Thirst] : no polydipsia [Easy Bleeding] : no tendency for easy bleeding

## 2021-04-09 NOTE — PHYSICAL EXAM
[General Appearance - Well Developed] : well developed [General Appearance - Well Nourished] : well nourished [Normal Conjunctiva] : the conjunctiva exhibited no abnormalities [Heart Rate And Rhythm] : heart rate and rhythm were normal [Heart Sounds] : normal S1 and S2 [Murmurs] : no murmurs present [Edema] : no peripheral edema present [] : no respiratory distress [Respiration, Rhythm And Depth] : normal respiratory rhythm and effort [Exaggerated Use Of Accessory Muscles For Inspiration] : no accessory muscle use [Bowel Sounds] : normal bowel sounds [Abdomen Soft] : soft [Abnormal Walk] : normal gait [FreeTextEntry1] : +tenderness upper thoracic spine [Nail Clubbing] : no clubbing of the fingernails [Skin Color & Pigmentation] : normal skin color and pigmentation [Oriented To Time, Place, And Person] : oriented to person, place, and time [Affect] : the affect was normal [Mood] : the mood was normal

## 2021-04-09 NOTE — DISCUSSION/SUMMARY
[FreeTextEntry1] : 36F h/o morbid obesity (BMI 44), rheumatoid arthritis, COVID infection in 12/2020 with intermittent chest pain presented to [facility] on 4/6/21 for recurrent chest pain, had D-dimer done 402 underwent CTA chest without main/lobar PE, EKG and Troponin x1 negative, now refer for initial cardiology evaluation. \par \par Atypical chest pain likely musculoskeletal related, EKG within normal, denies shortness of breath and recent CT chest unremarkable, doubt effect from long COVID syndrome. Has tenderness to upper thoracic spine, suspect rheumatoid arthritic pain? Discussed about need aggressive weight loss, she has never heard of bariatric surgery, will refer. \par \par \par 1. Atypical chest pain- await TTE if within normal then likely non-cardiac; defer stress testing for now. \par \par 2. Back/chest/joing pain- suspect rheumatoid arthritis? No formal workup/diagnosis in the past; referral to rheumatology, start meloxicam for 2 weeks along with omeprazole 40mg daily for h/o GERD\par \par 3. Morbid obesity- referral for bariatric surgery; consider metformin and GLP4 agonist for weight loss; await records of A1c and lipid panel from PMD's office. \par \par Need COVID vaccination. \par \par Follow up in 2 months.

## 2021-04-09 NOTE — HISTORY OF PRESENT ILLNESS
[FreeTextEntry1] : 36F h/o morbid obesity (BMI 44), rheumatoid arthritis, COVID infection in 12/2020 with intermittent chest pain presented to [facility] on 4/6/21 for recurrent chest pain, had D-dimer done 402 underwent CTA chest without main/lobar PE, EKG and Troponin x1 negative, now refer for initial cardiology evaluation. \par \par She had fever and body aches in 12/2020 during COVID, entire family had COVID, did not require hospitalization. Been having chest and back pain since then, pain is constant, mainly at rest and not on exertion, been getting worst since December, taking a medication from [facility] for arthritic pain and ibuprofen/Tylenol without improvement, denies any shortness of breath, no prior lung issues, no asthma. Has epigastric pain and also throat discomfort with acid reflux, not taking any PPI. Using Herbal Life supplement for weight loss for 6 years and also other weight loss supplement, labile weight with highest 255. 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 Reportedly had blood work done with PMD's no history of diabetes or cholesterol. \par \par \par Denies CAD or stroke in family\par Nonsmoker, no alcohol use\par

## 2021-05-06 ENCOUNTER — APPOINTMENT (OUTPATIENT)
Dept: CARDIOLOGY | Facility: CLINIC | Age: 36
End: 2021-05-06
Payer: COMMERCIAL

## 2021-05-06 PROCEDURE — 93306 TTE W/DOPPLER COMPLETE: CPT

## 2021-05-06 PROCEDURE — 99072 ADDL SUPL MATRL&STAF TM PHE: CPT

## 2021-06-15 ENCOUNTER — APPOINTMENT (OUTPATIENT)
Dept: CARDIOLOGY | Facility: CLINIC | Age: 36
End: 2021-06-15
Payer: COMMERCIAL

## 2021-06-15 VITALS
OXYGEN SATURATION: 99 % | DIASTOLIC BLOOD PRESSURE: 70 MMHG | HEART RATE: 78 BPM | SYSTOLIC BLOOD PRESSURE: 120 MMHG | BODY MASS INDEX: 45.5 KG/M2 | TEMPERATURE: 98.9 F | WEIGHT: 241 LBS | HEIGHT: 61 IN

## 2021-06-15 DIAGNOSIS — K21.9 GASTRO-ESOPHAGEAL REFLUX DISEASE W/OUT ESOPHAGITIS: ICD-10-CM

## 2021-06-15 DIAGNOSIS — Z71.89 OTHER SPECIFIED COUNSELING: ICD-10-CM

## 2021-06-15 DIAGNOSIS — R07.89 OTHER CHEST PAIN: ICD-10-CM

## 2021-06-15 DIAGNOSIS — E66.01 MORBID (SEVERE) OBESITY DUE TO EXCESS CALORIES: ICD-10-CM

## 2021-06-15 PROCEDURE — 99214 OFFICE O/P EST MOD 30 MIN: CPT

## 2021-06-15 NOTE — DISCUSSION/SUMMARY
[FreeTextEntry1] : 36F Kiswahili-speaking h/o morbid obesity (BMI 44), rheumatoid arthritis, COVID infection in 12/2020 with intermittent chest pain presented to [facility] on 4/6/21 for recurrent chest pain, had D-dimer done 402 underwent CTA chest without main/lobar PE, EKG and Troponin x1 negative, seen on 4/2021 for initial cardiology evaluation, suspect noncardiac chest pain with also back/joint pain, given 2 weeks course of meloxicam and had TTE done with LV EF 67%, referral for rheumatology and bariatric surgery evaluation but not yet seen, presents for follow up. \par \par Atypical chest pain likely musculoskeletal related vs. GERD, EKG within normal, denies shortness of breath and recent CT chest unremarkable, doubt effect from long COVID syndrome. Previously with tenderness to upper thoracic spine, suspect rheumatoid arthritic pain? Discussed about need aggressive weight loss, declining bariatric surgery. \par \par \par 1. Noncardiac chest pain- no recurrence, defer stress testing for now, continue omeprazole daily for suspected GERD. \par \par 2. Back/chest/joint pain- suspect rheumatoid arthritis? No formal workup/diagnosis in the past; referral to rheumatology, completed 2 weeks course of meloxicam with relief. \par \par 3. Morbid obesity- she does not want to pursue bariatric surgery yet; consider metformin and GLP4 agonist for weight loss; await records of A1c and lipid panel from PMD's office. \par \par Strongly advised COVID vaccination. \par \par \par Follow up as needed if new symptoms arise.

## 2021-06-15 NOTE — HISTORY OF PRESENT ILLNESS
[FreeTextEntry1] : 36F Upper sorbian-speaking h/o morbid obesity (BMI 44), rheumatoid arthritis, COVID infection in 12/2020 with intermittent chest pain presented to [facility] on 4/6/21 for recurrent chest pain, had D-dimer done 402 underwent CTA chest without main/lobar PE, EKG and Troponin x1 negative, seen on 4/2021 for initial cardiology evaluation, suspect noncardiac chest pain with also back/joint pain, given 2 weeks course of meloxicam and had TTE done with LV EF 67%, referral for rheumatology and bariatric surgery evaluation but not yet seen, presents for follow up. \par \par Reports meloxicam and omeprazole helped with the pain, since completed Meloxicam without recurrence of chest pain, still bothers by back pain. Does not want weight loss surgery trying to go on a diet on her own, does not do formal exercise except walking during work. \par \par Still hesitant about getting the COVID vaccine\par \par \par Prior visit 4/2021\par She had fever and body aches in 12/2020 during COVID, entire family had COVID, did not require hospitalization. Been having chest and back pain since then, pain is constant, mainly at rest and not on exertion, been getting worst since December, taking a medication from [facility] for arthritic pain and ibuprofen/Tylenol without improvement, denies any shortness of breath, no prior lung issues, no asthma. Has epigastric pain and also throat discomfort with acid reflux, not taking any PPI. Using Herbal Life supplement for weight loss for 6 years and also other weight loss supplement, labile weight with highest 255. 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 Reportedly had blood work done with PMD's no history of diabetes or cholesterol. \par \par \par Denies CAD or stroke in family\par Nonsmoker, no alcohol use\par

## 2021-11-02 NOTE — ED STATDOCS - ATTENDING CONTRIBUTION TO CARE
Photo Preface (Leave Blank If You Do Not Want): Photographs were obtained today Detail Level: Detailed Elidia: I performed a face to face bedside interview with patient regarding history of present illness, review of symptoms and past medical history. I completed an independent physical exam and ordered tests/medications as needed.  I have discussed patient's plan of care with advanced care provider. The advanced care provider assisted in  executing the discussed plan. I was available for any questions or issues that may have arose during the execution of the plan of care.

## 2023-01-17 ENCOUNTER — EMERGENCY (EMERGENCY)
Facility: HOSPITAL | Age: 38
LOS: 1 days | Discharge: HOME | End: 2023-01-17
Attending: STUDENT IN AN ORGANIZED HEALTH CARE EDUCATION/TRAINING PROGRAM
Payer: COMMERCIAL

## 2023-01-17 VITALS
OXYGEN SATURATION: 96 % | RESPIRATION RATE: 20 BRPM | SYSTOLIC BLOOD PRESSURE: 110 MMHG | HEART RATE: 99 BPM | DIASTOLIC BLOOD PRESSURE: 79 MMHG | TEMPERATURE: 98 F

## 2023-01-17 VITALS
OXYGEN SATURATION: 98 % | WEIGHT: 250 LBS | TEMPERATURE: 99 F | RESPIRATION RATE: 22 BRPM | HEART RATE: 116 BPM | DIASTOLIC BLOOD PRESSURE: 71 MMHG | SYSTOLIC BLOOD PRESSURE: 102 MMHG

## 2023-01-17 DIAGNOSIS — Z98.89 OTHER SPECIFIED POSTPROCEDURAL STATES: Chronic | ICD-10-CM

## 2023-01-17 DIAGNOSIS — Z90.49 ACQUIRED ABSENCE OF OTHER SPECIFIED PARTS OF DIGESTIVE TRACT: Chronic | ICD-10-CM

## 2023-01-17 LAB
ALBUMIN SERPL ELPH-MCNC: 5 G/DL — SIGNIFICANT CHANGE UP (ref 3.3–5.2)
ALP SERPL-CCNC: 100 U/L — SIGNIFICANT CHANGE UP (ref 40–120)
ALT FLD-CCNC: 65 U/L — HIGH
ANION GAP SERPL CALC-SCNC: 17 MMOL/L — SIGNIFICANT CHANGE UP (ref 5–17)
AST SERPL-CCNC: 43 U/L — HIGH
BASOPHILS # BLD AUTO: 0.03 K/UL — SIGNIFICANT CHANGE UP (ref 0–0.2)
BASOPHILS NFR BLD AUTO: 0.2 % — SIGNIFICANT CHANGE UP (ref 0–2)
BILIRUB SERPL-MCNC: 0.7 MG/DL — SIGNIFICANT CHANGE UP (ref 0.4–2)
BUN SERPL-MCNC: 17.5 MG/DL — SIGNIFICANT CHANGE UP (ref 8–20)
CALCIUM SERPL-MCNC: 9 MG/DL — SIGNIFICANT CHANGE UP (ref 8.4–10.5)
CHLORIDE SERPL-SCNC: 99 MMOL/L — SIGNIFICANT CHANGE UP (ref 96–108)
CO2 SERPL-SCNC: 17 MMOL/L — LOW (ref 22–29)
CREAT SERPL-MCNC: 0.81 MG/DL — SIGNIFICANT CHANGE UP (ref 0.5–1.3)
EGFR: 95 ML/MIN/1.73M2 — SIGNIFICANT CHANGE UP
EOSINOPHIL # BLD AUTO: 0 K/UL — SIGNIFICANT CHANGE UP (ref 0–0.5)
EOSINOPHIL NFR BLD AUTO: 0 % — SIGNIFICANT CHANGE UP (ref 0–6)
GLUCOSE SERPL-MCNC: 180 MG/DL — HIGH (ref 70–99)
HCG SERPL-ACNC: <4 MIU/ML — SIGNIFICANT CHANGE UP
HCT VFR BLD CALC: 45.2 % — HIGH (ref 34.5–45)
HGB BLD-MCNC: 15.7 G/DL — HIGH (ref 11.5–15.5)
HIV 1 & 2 AB SERPL IA.RAPID: SIGNIFICANT CHANGE UP
IMM GRANULOCYTES NFR BLD AUTO: 0.3 % — SIGNIFICANT CHANGE UP (ref 0–0.9)
LIDOCAIN IGE QN: 13 U/L — LOW (ref 22–51)
LYMPHOCYTES # BLD AUTO: 1.19 K/UL — SIGNIFICANT CHANGE UP (ref 1–3.3)
LYMPHOCYTES # BLD AUTO: 9.9 % — LOW (ref 13–44)
MAGNESIUM SERPL-MCNC: 1.9 MG/DL — SIGNIFICANT CHANGE UP (ref 1.6–2.6)
MCHC RBC-ENTMCNC: 29.3 PG — SIGNIFICANT CHANGE UP (ref 27–34)
MCHC RBC-ENTMCNC: 34.7 GM/DL — SIGNIFICANT CHANGE UP (ref 32–36)
MCV RBC AUTO: 84.3 FL — SIGNIFICANT CHANGE UP (ref 80–100)
MONOCYTES # BLD AUTO: 0.64 K/UL — SIGNIFICANT CHANGE UP (ref 0–0.9)
MONOCYTES NFR BLD AUTO: 5.3 % — SIGNIFICANT CHANGE UP (ref 2–14)
NEUTROPHILS # BLD AUTO: 10.18 K/UL — HIGH (ref 1.8–7.4)
NEUTROPHILS NFR BLD AUTO: 84.3 % — HIGH (ref 43–77)
PLATELET # BLD AUTO: 340 K/UL — SIGNIFICANT CHANGE UP (ref 150–400)
POTASSIUM SERPL-MCNC: 3.6 MMOL/L — SIGNIFICANT CHANGE UP (ref 3.5–5.3)
POTASSIUM SERPL-SCNC: 3.6 MMOL/L — SIGNIFICANT CHANGE UP (ref 3.5–5.3)
PROT SERPL-MCNC: 8.7 G/DL — SIGNIFICANT CHANGE UP (ref 6.6–8.7)
RBC # BLD: 5.36 M/UL — HIGH (ref 3.8–5.2)
RBC # FLD: 12.8 % — SIGNIFICANT CHANGE UP (ref 10.3–14.5)
SODIUM SERPL-SCNC: 133 MMOL/L — LOW (ref 135–145)
WBC # BLD: 12.08 K/UL — HIGH (ref 3.8–10.5)
WBC # FLD AUTO: 12.08 K/UL — HIGH (ref 3.8–10.5)

## 2023-01-17 PROCEDURE — 99284 EMERGENCY DEPT VISIT MOD MDM: CPT

## 2023-01-17 RX ORDER — ONDANSETRON 8 MG/1
4 TABLET, FILM COATED ORAL ONCE
Refills: 0 | Status: COMPLETED | OUTPATIENT
Start: 2023-01-17 | End: 2023-01-17

## 2023-01-17 RX ORDER — SODIUM CHLORIDE 9 MG/ML
1000 INJECTION, SOLUTION INTRAVENOUS ONCE
Refills: 0 | Status: COMPLETED | OUTPATIENT
Start: 2023-01-17 | End: 2023-01-17

## 2023-01-17 RX ADMIN — SODIUM CHLORIDE 1000 MILLILITER(S): 9 INJECTION, SOLUTION INTRAVENOUS at 22:34

## 2023-01-17 RX ADMIN — ONDANSETRON 4 MILLIGRAM(S): 8 TABLET, FILM COATED ORAL at 22:35

## 2023-01-17 NOTE — ED STATDOCS - CLINICAL SUMMARY MEDICAL DECISION MAKING FREE TEXT BOX
39 y/o female with PMHx of cholecystectomy presents to the ED c/o abdominal pain and N/V/D  since 02:00 this morning 39 y/o female with PMHx of cholecystectomy presents to the ED c/o abdominal pain and N/V/D  since 02:00 this morning. Will evaluate for biliary obstructive process, vs retained stone vs other intraabdominal pathology, will obtain labs, give anti-emetics, check CT abdomen pelvis 37 y/o female with PMHx of cholecystectomy presents to the ED c/o abdominal pain and N/V/D  since 02:00 this morning. Will evaluate for biliary obstructive process, vs retained stone vs other intraabdominal pathology, will obtain labs, give anti-emetics, check CT abdomen pelvis  WBC 12, CT fluid filled colon however no other acute findings. Pt feeling better, tolerating PO. Daughter sick w same sx, likely viral gastroenteritis, discussed supportive care and f/u PCP. Return precautions.

## 2023-01-17 NOTE — ED STATDOCS - PHYSICAL EXAMINATION
Const: Awake, alert and oriented. In no acute distress. Well appearing.  HEENT: NC/AT. Moist mucous membranes.  Eyes: No scleral icterus. EOMI.  Neck:. Soft and supple. Full ROM without pain.  Cardiac: Regular rate and regular rhythm. +S1/S2. Peripheral pulses 2+ and symmetric. No LE edema.  Resp: Speaking in full sentences. No evidence of respiratory distress. No wheezes, rales or rhonchi.  Abd: Soft, non-distended. Normal bowel sounds in all 4 quadrants. No guarding or rebound. + RUQ and epigastric ttp  Back: Spine midline and non-tender. No CVAT.  Skin: No rashes, abrasions or lacerations.  Lymph: No cervical lymphadenopathy.  Neuro: Awake, alert & oriented x 3. Moves all extremities symmetrically.

## 2023-01-17 NOTE — ED STATDOCS - OBJECTIVE STATEMENT
37 y/o female with PMHx of cholecystectomy presents to the ED c/o abdominal pain and N/V/D  since 02:00 this morning, w/o fevers, chills. Pt took pt took fiber Pepto-Bismol, probiotic to no relief. Pt denies fevers/chills, ha, loc, focal neuro deficits, cp/sob/palp, cough, urinary symptoms, recent travel and sick contacts. LMP 12/20's

## 2023-01-17 NOTE — ED ADULT NURSE NOTE - OBJECTIVE STATEMENT
pt A&Ox3 states she has been having abdominal pain with N/V/D that's started this morning. pt updated on plan of care awaiting lab results for next step in plan of care

## 2023-01-17 NOTE — ED STATDOCS - NSFOLLOWUPINSTRUCTIONS_ED_ALL_ED_FT
Por favor, tome los medicamentos según lo prescrito Ansir un seguimiento con el médico de atención primaria en 2-3 días Regrese a la franchesca de emergencias si empeora el dolor, no tolera la PO u otros síntomas nuevos o que empeoran    Gastroenteritis viral en los adultos    Viral Gastroenteritis, Adult       La gastroenteritis viral también se conoce terrie gripe estomacal. Esta afección podría afectar el estómago, el intestino montalvo y el intestino grueso. Puede causar diarrea líquida, fiebre y vómitos repentinos. Esta afección es causada por muchos virus diferentes. Estos virus pueden transmitirse de january persona a otra con mucha facilidad (son contagiosos).    La diarrea y los vómitos pueden hacerlo sentir débil y causar deshidratación. Es posible que no pueda retener los líquidos. La deshidratación puede causarle cansancio, sed, sequedad en la boca y disminución en la frecuencia con la que orina. Es importante restituir los líquidos que pierde por causa de la diarrea y los vómitos.      ¿Cuáles son las causas?    La gastroenteritis es causada por muchos virus, entre los que se incluyen el rotavirus y el norovirus. El norovirus es la causa más frecuente en los adultos. Puede enfermarse después de estar expuesto a los virus de otras personas. También puede enfermarse de las siguientes maneras:  •A través de la ingesta de alimentos o agua contaminados, o por tocar superficies contaminadas con alguno de estos virus.      •Al compartir utensilios u otros artículos personales con january persona infectada.        ¿Qué incrementa el riesgo?    Es más probable que tenga esta afección si:  •Tiene debilitado el sistema de defensa del organismo (sistema inmunitario).      •Viven con sohail o más niños menores de 2 años.      •Vive en un hogar de ancianos.      •Viaja en un crucero.        ¿Cuáles son los signos o los síntomas?    Los síntomas de esta afección suelen aparecer entre 1 y 3 días después de la exposición al virus. Pueden durar algunos días o incluso january semana. Los síntomas frecuentes son diarrea líquida y vómitos. Otros síntomas pueden incluir los siguientes:  •Fiebre.      •Dolor de marylou.      •Fatiga.      •Dolor en el abdomen.      •Escalofríos.      •Debilidad.      •Náuseas.      •Liv musculares.      •Pérdida del apetito.        ¿Cómo se diagnostica?    Esta afección se diagnostica mediante january revisión de los antecedentes médicos y un examen físico. También podrían hacerle un análisis de materia fecal para detectar virus u otras infecciones.      ¿Cómo se trata?    Por lo general, esta afección desaparece por sí celeste. El tratamiento se centra en prevenir la deshidratación y reponer los líquidos perdidos (rehidratación). El tratamiento de esta afección puede incluir:  •January solución de rehidratación oral (SRO) para reemplazar sales y minerales (electrolitos) importantes en el cuerpo. Tómela si se lo indicó el médico. Esta es january bebida que se vende en farmacias y tiendas minoristas.      •Medicamentos para aliviar los síntomas.      •Suplementos probióticos para disminuir los síntomas de diarrea.      •Administración de líquidos por vía intravenosa si la deshidratación es grave.      Los adultos mayores y las personas que tienen otras enfermedades o un sistema inmunitario débil están en mayor riesgo de deshidratación.      Siga estas instrucciones en garces casa:       Comida y bebida      •Amor january SRO terrie se lo haya indicado el médico.    •En la medida en que pueda, cristal líquidos michelle en pequeñas cantidades. Los líquidos transparentes son, por ejemplo:  •Agua.      •Trocitos de hielo.      •Jugo de frutas diluido.      •Bebidas deportivas de bajas calorías.        •Cristal suficiente líquido terrie para mantener la orina de color amarillo pálido.      •Coma pequeñas cantidades de alimentos saludables cada 3 a 4 horas según garces tolerancia. Estos pueden incluir cereales integrales, frutas, verduras, magda magras y yogur.      •Evite consumir líquidos que contengan mucha azúcar o cafeína, terrie bebidas energéticas, bebidas deportivas y refrescos.      •Evite los alimentos condimentados o con alto contenido de grasa.      •Evite amor alcohol.      Instrucciones generales     •Lávese las erika con frecuencia, especialmente después de tener diarrea o vómitos. Use desinfectante para erika si no dispone de agua y jabón.      •Asegúrese de que todas las personas que viven en garces casa se laven brian las erika y con frecuencia.      •[place] los medicamentos de venta aashish y los recetados solamente terrie se lo haya indicado el médico.      •Descanse en garces casa mientras se recupera.      •Controle garces afección para detectar cualquier cambio.      •[place] un baño caliente para ayudar a disminuir el ardor o el dolor causados por los episodios frecuentes de diarrea.      •Concurra a todas las visitas de seguimiento terrie se lo haya indicado el médico. [place] es importante.        Comuníquese con un médico si:    •No retiene los líquidos.      •Tiene síntomas que empeoran.      •Tiene nuevos síntomas.      •Se siente mareado o siente que va a desvanecerse.      •Presenta calambres musculares.        Solicite ayuda inmediatamente si:    •Siente dolor en el pecho.      •Se siente muy débil o se desmaya.      •Observa jessica en el vómito.      •Tiene vómito que se asemeja al poso del café.      •Tiene heces con jessica, de color edgar, o con aspecto alquitranado.      •Siente dolor de marylou intenso, rigidez en el tammie, o ambas cosas.      •Tiene january erupción cutánea.      •Tiene dolor intenso, cólicos o distensión en el abdomen.      •Tiene problemas para respirar o respira muy rápidamente.      •Tiene latidos cardíacos acelerados.      •Tiene la piel fría y húmeda.      •Se siente confundido.      •Tiene dolor al orinar.    •Tiene signos de deshidratación, terrie los siguientes:  •Orina de color oscuro, muy escasa o falta de orina.      •Labios agrietados.      •Sequedad de boca.      •Ojos hundidos.      •Somnolencia.      •Debilidad.          Resumen    •La gastroenteritis viral también se conoce terrie gripe estomacal. Puede causar diarrea líquida, fiebre y vómitos repentinos.      •Esta afección se puede transmitir de january persona a otra con mucha facilidad (es contagiosa).      •[place] january SRO si se lo indicó el médico. Esta es january bebida que se vende en farmacias y tiendas minoristas.      •Lávese las erika con frecuencia, especialmente después de tener diarrea o vómitos. Use desinfectante para erika si no dispone de agua y jabón.      Esta información no tiene terrie fin reemplazar el consejo del médico. Asegúrese de hacerle al médico cualquier pregunta que tenga.

## 2023-01-17 NOTE — ED STATDOCS - PATIENT PORTAL LINK FT
You can access the FollowMyHealth Patient Portal offered by [facility] by registering at the following website: [email or web address]. By joining [employer]’s FollowMyHealth portal, you will also be able to view your health information using other applications (apps) compatible with our system.

## 2023-01-17 NOTE — ED STATDOCS - PROGRESS NOTE DETAILS
Nikki Jamison PA :  PT evaluated by intake physician. HPI/PE/ROS as noted above. Will follow up plan per intake physician   wbc 12 mild dehydration. CT no acute infection, noted diarrhea. vitals improving and feeling better after meds. tolerating PO. pt's daughter is also sick w n/v/d, suspect gastroenteritis. will dc with zofran supportive care f/u PCP and discussed return precautions

## 2023-01-17 NOTE — ED STATDOCS - NS ED ATTENDING STATEMENT MOD
This was a shared visit with the FELIZ. I reviewed and verified the documentation and independently performed the documented:

## 2023-01-18 PROCEDURE — 74177 CT ABD & PELVIS W/CONTRAST: CPT | Mod: MA

## 2023-01-18 PROCEDURE — 80053 COMPREHEN METABOLIC PANEL: CPT

## 2023-01-18 PROCEDURE — 36415 COLL VENOUS BLD VENIPUNCTURE: CPT

## 2023-01-18 PROCEDURE — 96374 THER/PROPH/DIAG INJ IV PUSH: CPT | Mod: XU

## 2023-01-18 PROCEDURE — 96361 HYDRATE IV INFUSION ADD-ON: CPT

## 2023-01-18 PROCEDURE — 74177 CT ABD & PELVIS W/CONTRAST: CPT | Mod: 26,MA

## 2023-01-18 PROCEDURE — 85025 COMPLETE CBC W/AUTO DIFF WBC: CPT

## 2023-01-18 PROCEDURE — 99284 EMERGENCY DEPT VISIT MOD MDM: CPT | Mod: 25

## 2023-01-18 PROCEDURE — 83735 ASSAY OF MAGNESIUM: CPT

## 2023-01-18 PROCEDURE — 84702 CHORIONIC GONADOTROPIN TEST: CPT

## 2023-01-18 PROCEDURE — 83690 ASSAY OF LIPASE: CPT

## 2023-01-18 PROCEDURE — 86703 HIV-1/HIV-2 1 RESULT ANTBDY: CPT

## 2023-01-18 RX ORDER — ONDANSETRON 8 MG/1
1 TABLET, FILM COATED ORAL
Qty: 2 | Refills: 0
Start: 2023-01-18

## 2023-01-19 RX ORDER — SODIUM BICARBONATE 1 MEQ/ML
0.08 SYRINGE (ML) INTRAVENOUS
Qty: 75 | Refills: 0 | Status: DISCONTINUED | OUTPATIENT
Start: 2023-01-19 | End: 2023-01-19

## 2023-01-24 NOTE — ED ADULT TRIAGE NOTE - INTERPRETER'S NAME
Juana Terrell arrived to room # 333. Presented with: pyelonephritis   Mental Status: Patient is oriented, alert, coherent, logical, thought processes intact, and able to concentrate and follow conversation. Vitals:    01/24/23 0134   BP: 137/86   Pulse: 86   Resp: 18   Temp: 98.2 °F (36.8 °C)   SpO2: 90%     Patient safety contract and falls prevention contract reviewed with patient Yes. Oriented Patient to room. Call light within reach. Yes.   Needs, issues or concerns expressed at this time: no.      Electronically signed by Annmarie House RN on 1/24/2023 at 3:00 AM jose

## 2023-05-05 NOTE — ED ADULT NURSE NOTE - BREATHING, MLM
A call was placed to [facility] [phone]. Spoke with intake regarding the referral. Answered all questions. Dr. Jennifer Cottrell was sent a [employer] message asking for a brief note to be entered that confirms the pt is medically stable.     Rocael Singleton, RN  Case Management  [phone] CM following: CM spoke with nurse on duty who provided the name and number of pt's  Diego Fatima c: [phone] o:[phone]. PO shared with nurse that the pt has been through the [facility] program before and PO is not certain if pt would be able to return to [facility] in the future. CM notes that psychiatry with Leda Deanna reached out to [facility] and is waiting a call back. CM to also follow up with [facility] for discharge planning.   Electronically signed by HERNANDEZ Mendoza on 5/4/2023 at 5:08 PM  [phone] Case management is following for discharge planning. The chart was reviewed, and Dr. Pedro Major has stated the pt is ready for discharge. A call was placed to [facility] where Ms. Villalpando was receiving treatment prior to admission. [phone]. Spoke with [address] [place] [address] in intake. Beds are available. Faxed the pt's records for review [phone]    Michael Brunner RN  Case Management  [phone]    Addendum [id number] with [address] [place] [address] in intake at [facility]. The psychiatrist, Dr. Reza Small, is requesting the pt be monitored for a couple more days before she will accept back. The earliest she would accept is Monday. Waiting for the psychiatry NP, Vidhya, to reevaluate. [facility] has declined to accept the pt back. In order to make a referral to the [facility] for assistance, a rapid COVID-Flu combo swab and a UDS will be needed. The MD note must specifically state the pt is medically stable for transfer to inpatient psychiatry. A completed 72h hold has been placed on the chart by Psych Vidhya STARK. Once the labs have resulted, CM will contact the [place] [id number] for assistance with placement.     Veronica Daniel RN  Case Management  [phone] others, and if so, who? Yes (mother, Lakeshia Barros)  Plans to Return to Present Housing: Other (see comment) (potentially, if she needs to return to acute psych, she will need a psychiatry consult and a referral back to [facility])  Potential Assistance needed at discharge: N/A            Potential DME:  deferred  Patient expects to discharge to: Behavioral Health/Substance/Detox  Plan for transportation at discharge:  [facility]    Financial    Payor: Ronna Ralph / Plan: Ernesto Silverman / Product Type: *No Product type* /     Does insurance require precert for SNF: Yes    Potential assistance Purchasing Medications: Yes  Meds-to-Beds request:        [facility]/pharmacy #[id number]- [address] [address] [phone] - F [phone]  [address] [place] [address]  [id number] [place] [id number]  Phone: [phone] Fax: [phone]      Notes:    Factors facilitating achievement of predicted outcomes: Family support and has outpatient psychiatry services    Barriers to discharge: Confusion and Medical complications    The Patient and/or Patient Representative Agree with the Discharge Plan?  yves Fuller, RN  Case Management Department  [phone] Spontaneous, unlabored and symmetrical

## 2023-11-28 ENCOUNTER — OFFICE (OUTPATIENT)
Dept: URBAN - METROPOLITAN AREA CLINIC 6 | Facility: CLINIC | Age: 38
Setting detail: OPHTHALMOLOGY
End: 2023-11-28
Payer: COMMERCIAL

## 2023-11-28 DIAGNOSIS — G43.009: ICD-10-CM

## 2023-11-28 DIAGNOSIS — H43.393: ICD-10-CM

## 2023-11-28 PROCEDURE — 92004 COMPRE OPH EXAM NEW PT 1/>: CPT | Performed by: OPHTHALMOLOGY

## 2023-11-28 ASSESSMENT — SPHEQUIV_DERIVED
OS_SPHEQUIV: -0.75
OS_SPHEQUIV: -0.5
OD_SPHEQUIV: -1
OD_SPHEQUIV: -0.625

## 2023-11-28 ASSESSMENT — REFRACTION_MANIFEST
OU_VA: 20/20-2
OD_CYLINDER: -0.25
OS_SPHERE: -0.25
OD_VA1: 20/25-2
OS_AXIS: 155
OS_VA1: 20/25-2
OD_SPHERE: -0.50
OS_CYLINDER: -0.50
OD_AXIS: 025

## 2023-11-28 ASSESSMENT — REFRACTION_AUTOREFRACTION
OS_CYLINDER: -1.00
OD_SPHERE: -0.75
OS_AXIS: 157
OD_CYLINDER: -0.50
OD_AXIS: 025
OS_SPHERE: -0.25

## 2023-11-28 ASSESSMENT — CONFRONTATIONAL VISUAL FIELD TEST (CVF)
OS_FINDINGS: FULL
OD_FINDINGS: FULL

## 2024-04-17 NOTE — ED ADULT TRIAGE NOTE - PATIENT ON (OXYGEN DELIVERY METHOD)
Patient settled in room 3037. Report received from Opal. Room orientation completed, IPOC and education initiated. On admission skin assessment done with Opal pacu sania   room air

## 2024-10-29 ENCOUNTER — OFFICE (OUTPATIENT)
Dept: URBAN - METROPOLITAN AREA CLINIC 6 | Facility: CLINIC | Age: 39
Setting detail: OPHTHALMOLOGY
End: 2024-10-29
Payer: COMMERCIAL

## 2024-10-29 DIAGNOSIS — H11.32: ICD-10-CM

## 2024-10-29 DIAGNOSIS — H11.151: ICD-10-CM

## 2024-10-29 PROCEDURE — 92012 INTRM OPH EXAM EST PATIENT: CPT | Performed by: OPHTHALMOLOGY

## 2024-10-29 ASSESSMENT — KERATOMETRY
OS_K2POWER_DIOPTERS: 45.00
OD_K2POWER_DIOPTERS: 44.75
OD_AXISANGLE_DEGREES: 081
OS_AXISANGLE_DEGREES: 075
OS_K1POWER_DIOPTERS: 43.50
METHOD_AUTO_MANUAL: AUTO
OD_K1POWER_DIOPTERS: 43.75

## 2024-10-29 ASSESSMENT — REFRACTION_AUTOREFRACTION
OD_CYLINDER: -0.50
OD_SPHERE: -0.75
OS_SPHERE: -0.25
OS_AXIS: 157
OD_AXIS: 025
OS_CYLINDER: -1.00

## 2024-10-29 ASSESSMENT — REFRACTION_MANIFEST
OS_AXIS: 155
OD_CYLINDER: -0.25
OU_VA: 20/20-2
OD_AXIS: 025
OD_VA1: 20/25-2
OD_SPHERE: -0.50
OS_SPHERE: -0.25
OS_VA1: 20/25-2
OS_CYLINDER: -0.50

## 2024-10-29 ASSESSMENT — TONOMETRY
OD_IOP_MMHG: 21
OS_IOP_MMHG: 19

## 2024-10-29 ASSESSMENT — CONFRONTATIONAL VISUAL FIELD TEST (CVF)
OS_FINDINGS: FULL
OD_FINDINGS: FULL

## 2024-10-29 ASSESSMENT — VISUAL ACUITY
OS_BCVA: 20/25-2
OD_BCVA: 20/30-2

## 2024-11-19 ENCOUNTER — OFFICE (OUTPATIENT)
Dept: URBAN - METROPOLITAN AREA CLINIC 6 | Facility: CLINIC | Age: 39
Setting detail: OPHTHALMOLOGY
End: 2024-11-19
Payer: COMMERCIAL

## 2024-11-19 ENCOUNTER — RX ONLY (RX ONLY)
Age: 39
End: 2024-11-19

## 2024-11-19 DIAGNOSIS — E11.9: ICD-10-CM

## 2024-11-19 DIAGNOSIS — H11.153: ICD-10-CM

## 2024-11-19 DIAGNOSIS — H43.393: ICD-10-CM

## 2024-11-19 DIAGNOSIS — G43.009: ICD-10-CM

## 2024-11-19 PROCEDURE — 92014 COMPRE OPH EXAM EST PT 1/>: CPT | Performed by: OPHTHALMOLOGY

## 2024-11-19 ASSESSMENT — REFRACTION_AUTOREFRACTION
OS_SPHERE: -0.50
OS_CYLINDER: -1.00
OD_AXIS: 015
OS_AXIS: 162
OD_CYLINDER: -0.25
OD_SPHERE: -0.75

## 2024-11-19 ASSESSMENT — VISUAL ACUITY
OS_BCVA: 20/20-2
OD_BCVA: 20/30-2

## 2024-11-19 ASSESSMENT — KERATOMETRY
OD_K2POWER_DIOPTERS: 44.75
OS_K1POWER_DIOPTERS: 43.50
OD_K1POWER_DIOPTERS: 44.00
OD_AXISANGLE_DEGREES: 087
OS_K2POWER_DIOPTERS: 44.75
OS_AXISANGLE_DEGREES: 072
METHOD_AUTO_MANUAL: AUTO

## 2024-11-19 ASSESSMENT — REFRACTION_MANIFEST
OS_AXIS: 162
OD_CYLINDER: -0.25
OU_VA: 20/20-
OD_SPHERE: -0.50
OD_VA1: 20/20
OS_VA1: 20/20-1
OS_CYLINDER: -1.00
OD_AXIS: 015
OS_SPHERE: -0.50

## 2024-11-19 ASSESSMENT — TONOMETRY
OS_IOP_MMHG: 18
OD_IOP_MMHG: 18

## 2024-11-19 ASSESSMENT — CONFRONTATIONAL VISUAL FIELD TEST (CVF)
OS_FINDINGS: FULL
OD_FINDINGS: FULL

## 2025-01-18 NOTE — ED STATDOCS - MEDICAL DECISION MAKING DETAILS
Will obtain UA, labs and US and re-evaluate.
+ left lateral lean able to correct over time/dependent (less than 25% patients effort)